# Patient Record
Sex: FEMALE | Race: WHITE
[De-identification: names, ages, dates, MRNs, and addresses within clinical notes are randomized per-mention and may not be internally consistent; named-entity substitution may affect disease eponyms.]

---

## 2019-03-21 ENCOUNTER — HOSPITAL ENCOUNTER (EMERGENCY)
Dept: HOSPITAL 8 - ED | Age: 54
Discharge: HOME | End: 2019-03-21
Payer: COMMERCIAL

## 2019-03-21 VITALS — BODY MASS INDEX: 32.27 KG/M2 | WEIGHT: 182.1 LBS | HEIGHT: 63 IN

## 2019-03-21 VITALS — SYSTOLIC BLOOD PRESSURE: 106 MMHG | DIASTOLIC BLOOD PRESSURE: 68 MMHG

## 2019-03-21 DIAGNOSIS — I48.0: Primary | ICD-10-CM

## 2019-03-21 LAB
ALBUMIN SERPL-MCNC: 4.3 G/DL (ref 3.4–5)
ALP SERPL-CCNC: 84 U/L (ref 45–117)
ALT SERPL-CCNC: 22 U/L (ref 12–78)
ANION GAP SERPL CALC-SCNC: 9 MMOL/L (ref 5–15)
APTT BLD: 26 SECONDS (ref 25–31)
BASOPHILS # BLD AUTO: 0.03 X10^3/UL (ref 0–0.1)
BASOPHILS NFR BLD AUTO: 0 % (ref 0–1)
BILIRUB SERPL-MCNC: 1 MG/DL (ref 0.2–1)
CALCIUM SERPL-MCNC: 9.3 MG/DL (ref 8.5–10.1)
CHLORIDE SERPL-SCNC: 110 MMOL/L (ref 98–107)
CREAT SERPL-MCNC: 0.93 MG/DL (ref 0.55–1.02)
EOSINOPHIL # BLD AUTO: 0.15 X10^3/UL (ref 0–0.4)
EOSINOPHIL NFR BLD AUTO: 2 % (ref 1–7)
ERYTHROCYTE [DISTWIDTH] IN BLOOD BY AUTOMATED COUNT: 14.4 % (ref 9.6–15.2)
INR PPP: 1.05 (ref 0.93–1.1)
LYMPHOCYTES # BLD AUTO: 2.53 X10^3/UL (ref 1–3.4)
LYMPHOCYTES NFR BLD AUTO: 34 % (ref 22–44)
MCH RBC QN AUTO: 32.3 PG (ref 27–34.8)
MCHC RBC AUTO-ENTMCNC: 34.2 G/DL (ref 32.4–35.8)
MCV RBC AUTO: 94.4 FL (ref 80–100)
MD: NO
MONOCYTES # BLD AUTO: 0.46 X10^3/UL (ref 0.2–0.8)
MONOCYTES NFR BLD AUTO: 6 % (ref 2–9)
NEUTROPHILS # BLD AUTO: 4.28 X10^3/UL (ref 1.8–6.8)
NEUTROPHILS NFR BLD AUTO: 57 % (ref 42–75)
PLATELET # BLD AUTO: 253 X10^3/UL (ref 130–400)
PMV BLD AUTO: 8.2 FL (ref 7.4–10.4)
PROT SERPL-MCNC: 7.5 G/DL (ref 6.4–8.2)
PROTHROMBIN TIME: 11 SECONDS (ref 9.6–11.5)
RBC # BLD AUTO: 4.72 X10^6/UL (ref 3.82–5.3)
T4 FREE SERPL-MCNC: 0.98 NG/DL (ref 0.76–1.46)
TROPONIN I SERPL-MCNC: < 0.015 NG/ML (ref 0–0.04)
TSH SERPL-ACNC: 1.55 MIU/L (ref 0.36–3.74)

## 2019-03-21 PROCEDURE — 84439 ASSAY OF FREE THYROXINE: CPT

## 2019-03-21 PROCEDURE — 83880 ASSAY OF NATRIURETIC PEPTIDE: CPT

## 2019-03-21 PROCEDURE — 85610 PROTHROMBIN TIME: CPT

## 2019-03-21 PROCEDURE — 85025 COMPLETE CBC W/AUTO DIFF WBC: CPT

## 2019-03-21 PROCEDURE — 71045 X-RAY EXAM CHEST 1 VIEW: CPT

## 2019-03-21 PROCEDURE — 84443 ASSAY THYROID STIM HORMONE: CPT

## 2019-03-21 PROCEDURE — 84484 ASSAY OF TROPONIN QUANT: CPT

## 2019-03-21 PROCEDURE — 85730 THROMBOPLASTIN TIME PARTIAL: CPT

## 2019-03-21 PROCEDURE — 93005 ELECTROCARDIOGRAM TRACING: CPT

## 2019-03-21 PROCEDURE — 96374 THER/PROPH/DIAG INJ IV PUSH: CPT

## 2019-03-21 PROCEDURE — 83735 ASSAY OF MAGNESIUM: CPT

## 2019-03-21 PROCEDURE — 36415 COLL VENOUS BLD VENIPUNCTURE: CPT

## 2019-03-21 PROCEDURE — 80053 COMPREHEN METABOLIC PANEL: CPT

## 2019-03-21 NOTE — NUR
TASK RN: PT IN NSR, RATE 70'S. DENIES DIZZINESS/WEAKNESS. STATE "I FEEL MUCH 
BETTER". 

PT MEDICATED PER EMAR. DC EDUCATION PROVIDED, PT DEMONSTRATES UNDERSTANDING. PT 
AMBULATED STEADILY TO DC WITH RN AND SO. SO TO TRANSPORT PT HOME.

## 2019-03-21 NOTE — NUR
Patient reports a history of atrial fibrillation with successful ablation, 
states beginning yesterday she had sensation of "my heart racing" which felt 
like prior episodes of atrial fibrillation. Patient denies chest pain or 
shortness of breath. Patient is alert and appropriate in no apparent distress 
at this time. Continuous blood pressure, SPO2 and cardiac monitoring in place 
which shows normal sinus rhythm and atrial fibrillation intermittently. Call 
bell placed within reach.

## 2019-03-21 NOTE — NUR
Cardizem administered as ordered and documented, patient now in sinus rhythm. 
Call bell within reach.